# Patient Record
Sex: MALE | Race: WHITE | URBAN - METROPOLITAN AREA
[De-identification: names, ages, dates, MRNs, and addresses within clinical notes are randomized per-mention and may not be internally consistent; named-entity substitution may affect disease eponyms.]

---

## 2022-01-26 ENCOUNTER — LAB OUTSIDE AN ENCOUNTER (OUTPATIENT)
Dept: URBAN - METROPOLITAN AREA CLINIC 19 | Facility: CLINIC | Age: 72
End: 2022-01-26

## 2022-01-26 ENCOUNTER — OFFICE VISIT (OUTPATIENT)
Dept: URBAN - METROPOLITAN AREA CLINIC 19 | Facility: CLINIC | Age: 72
End: 2022-01-26
Payer: MEDICARE

## 2022-01-26 ENCOUNTER — WEB ENCOUNTER (OUTPATIENT)
Dept: URBAN - METROPOLITAN AREA CLINIC 19 | Facility: CLINIC | Age: 72
End: 2022-01-26

## 2022-01-26 ENCOUNTER — TELEPHONE ENCOUNTER (OUTPATIENT)
Dept: URBAN - METROPOLITAN AREA CLINIC 19 | Facility: CLINIC | Age: 72
End: 2022-01-26

## 2022-01-26 VITALS
TEMPERATURE: 98.1 F | BODY MASS INDEX: 27.36 KG/M2 | WEIGHT: 202 LBS | SYSTOLIC BLOOD PRESSURE: 134 MMHG | HEART RATE: 88 BPM | HEIGHT: 72 IN | DIASTOLIC BLOOD PRESSURE: 80 MMHG

## 2022-01-26 DIAGNOSIS — D12.6 COLON ADENOMA: ICD-10-CM

## 2022-01-26 DIAGNOSIS — K59.00 CONSTIPATION, UNSPECIFIED CONSTIPATION TYPE: ICD-10-CM

## 2022-01-26 DIAGNOSIS — Z86.010 PERSONAL HISTORY OF COLONIC POLYPS: ICD-10-CM

## 2022-01-26 PROCEDURE — 99213 OFFICE O/P EST LOW 20 MIN: CPT | Performed by: NURSE PRACTITIONER

## 2022-01-26 NOTE — HPI-TODAY'S VISIT:
Mr. Wynn is a 71-year-old male who presents today for constipation and colon cancer screening.  His last colonoscopy was with Dr. Neff 8/7/2019-preparation of the colon was fair, one 6 mm polyp in the ascending colon, six 8 to 15 mm polyps in the transverse colon, diverticulosis in the sigmoid colon and ascending colon, two 10 to 15 mm polyps in the sigmoid colon, one 25 mm polyp in the rectum.  Repeat colonoscopy in 1 year. Polyps were all noted to be tubular adenomas with the exception of the rectal polyp which was adenomatous polyp with focal high-grade dysplasia. Also, of note he had had a colonoscopy 5/2019 where many large polyps were also found and he was advised to repeat in 2 months. Today, he reports he has been having issues with constipation for about a year now.  He reports since doing Opdivo treatment for lung cancer he has had constipation.  He was having a bowel movement about 3 times a week.  He reports he began eating prunes daily about a week ago and has been having daily bowel movements.  He also reports less bloating.  He reports prior to Opdivo treatment he was having a daily bowel movement.  He denies abdominal pain.  No blood in stool.  CBC from 12/1/2021 with no noted anemia.  Reports he is tried fiber and MiraLAX in the past with no improvement. He denies cardiac/kidney/lung disease, diabetes, blood thinners, and home O2.

## 2022-01-27 PROBLEM — 14760008: Status: ACTIVE | Noted: 2022-01-26

## 2022-01-27 PROBLEM — 399432003: Status: ACTIVE | Noted: 2022-01-26

## 2022-02-02 ENCOUNTER — OFFICE VISIT (OUTPATIENT)
Dept: URBAN - METROPOLITAN AREA LAB 2 | Facility: LAB | Age: 72
End: 2022-02-02
Payer: MEDICARE

## 2022-02-02 DIAGNOSIS — Z86.010 ADENOMAS PERSONAL HISTORY OF COLONIC POLYPS: ICD-10-CM

## 2022-02-02 DIAGNOSIS — D12.2 ADENOMA OF ASCENDING COLON: ICD-10-CM

## 2022-02-02 DIAGNOSIS — D12.3 ADENOMA OF TRANSVERSE COLON: ICD-10-CM

## 2022-02-02 DIAGNOSIS — D12.5 ADENOMA OF SIGMOID COLON: ICD-10-CM

## 2022-02-02 PROCEDURE — 45385 COLONOSCOPY W/LESION REMOVAL: CPT | Performed by: INTERNAL MEDICINE

## 2022-02-24 ENCOUNTER — OFFICE VISIT (OUTPATIENT)
Dept: URBAN - METROPOLITAN AREA CLINIC 19 | Facility: CLINIC | Age: 72
End: 2022-02-24
Payer: MEDICARE

## 2022-02-24 VITALS
BODY MASS INDEX: 27.22 KG/M2 | WEIGHT: 201 LBS | HEIGHT: 72 IN | TEMPERATURE: 98.7 F | DIASTOLIC BLOOD PRESSURE: 80 MMHG | HEART RATE: 83 BPM | OXYGEN SATURATION: 94 % | SYSTOLIC BLOOD PRESSURE: 132 MMHG

## 2022-02-24 DIAGNOSIS — K59.09 CHANGE IN BOWEL MOVEMENTS INTERMITTENT CONSTIPATION. URGENCY IN THE MORNING.: ICD-10-CM

## 2022-02-24 DIAGNOSIS — Z86.010 PERSONAL HISTORY OF COLONIC POLYPS: ICD-10-CM

## 2022-02-24 PROCEDURE — 99213 OFFICE O/P EST LOW 20 MIN: CPT | Performed by: NURSE PRACTITIONER

## 2022-02-24 NOTE — HPI-TODAY'S VISIT:
1/26/22: Mr. Wynn is a 71-year-old male who presents today for constipation and colon cancer screening.  His last colonoscopy was with Dr. Neff 8/7/2019-preparation of the colon was fair, one 6 mm polyp in the ascending colon, six 8 to 15 mm polyps in the transverse colon, diverticulosis in the sigmoid colon and ascending colon, two 10 to 15 mm polyps in the sigmoid colon, one 25 mm polyp in the rectum.  Repeat colonoscopy in 1 year. Polyps were all noted to be tubular adenomas with the exception of the rectal polyp which was adenomatous polyp with focal high-grade dysplasia. Also, of note he had had a colonoscopy 5/2019 where many large polyps were also found and he was advised to repeat in 2 months. Today, he reports he has been having issues with constipation for about a year now.  He reports since doing Opdivo treatment for lung cancer he has had constipation.  He was having a bowel movement about 3 times a week.  He reports he began eating prunes daily about a week ago and has been having daily bowel movements.  He also reports less bloating.  He reports prior to Opdivo treatment he was having a daily bowel movement.  He denies abdominal pain.  No blood in stool.  CBC from 12/1/2021 with no noted anemia.  Reports he is tried fiber and MiraLAX in the past with no improvement. He denies cardiac/kidney/lung disease, diabetes, blood thinners, and home O2.  Colonoscopy with Dr. Neff 2/2/2022-preparation of the colon was fair. Two 5 to 6 mm polyps in the transverse colon. One 6 mm polyp in the ascending colon. Four 5 to 9 polyps at the hepatic flexure. Four 5 to 10 polyps in the proximal transverse colon. One 23 mm polyp in the transverse colon. One 12 mm polyp in the sigmoid colon. One 10 mm polyp in the sigmoid colon.  Repeat colonoscopy in 1 year.  Needs 2-day prep.  Refer to genetic counselor as he has had over 20 polyps.    2/24/22: Today, Mr. Wynn reports his constipation was better after he had his colonoscopy.  He reports he is taking MiraLAX 3-5 times a week.  He reports he is eating a higher fiber diet and less fried food.  He reports he will have 3 bowel movements in 7 days.

## 2022-02-28 ENCOUNTER — OFFICE VISIT (OUTPATIENT)
Dept: URBAN - METROPOLITAN AREA CLINIC 19 | Facility: CLINIC | Age: 72
End: 2022-02-28

## 2022-05-23 ENCOUNTER — OFFICE VISIT (OUTPATIENT)
Dept: URBAN - METROPOLITAN AREA CLINIC 19 | Facility: CLINIC | Age: 72
End: 2022-05-23
Payer: MEDICARE

## 2022-05-23 VITALS
WEIGHT: 203 LBS | OXYGEN SATURATION: 96 % | HEIGHT: 72 IN | HEART RATE: 78 BPM | SYSTOLIC BLOOD PRESSURE: 151 MMHG | TEMPERATURE: 98.2 F | DIASTOLIC BLOOD PRESSURE: 93 MMHG | BODY MASS INDEX: 27.5 KG/M2

## 2022-05-23 DIAGNOSIS — Z86.010 HISTORY OF COLON POLYPS: ICD-10-CM

## 2022-05-23 DIAGNOSIS — K59.04 CHRONIC IDIOPATHIC CONSTIPATION: ICD-10-CM

## 2022-05-23 PROCEDURE — 99214 OFFICE O/P EST MOD 30 MIN: CPT | Performed by: INTERNAL MEDICINE

## 2022-05-23 NOTE — HPI-TODAY'S VISIT:
1/26/22: Mr. Wynn is a 71-year-old male who presents today for constipation and colon cancer screening.  His last colonoscopy was with Dr. Neff 8/7/2019-preparation of the colon was fair, one 6 mm polyp in the ascending colon, six 8 to 15 mm polyps in the transverse colon, diverticulosis in the sigmoid colon and ascending colon, two 10 to 15 mm polyps in the sigmoid colon, one 25 mm polyp in the rectum.  Repeat colonoscopy in 1 year. Polyps were all noted to be tubular adenomas with the exception of the rectal polyp which was adenomatous polyp with focal high-grade dysplasia. Also, of note he had had a colonoscopy 5/2019 where many large polyps were also found and he was advised to repeat in 2 months. Today, he reports he has been having issues with constipation for about a year now.  He reports since doing Opdivo treatment for lung cancer he has had constipation.  He was having a bowel movement about 3 times a week.  He reports he began eating prunes daily about a week ago and has been having daily bowel movements.  He also reports less bloating.  He reports prior to Opdivo treatment he was having a daily bowel movement.  He denies abdominal pain.  No blood in stool.  CBC from 12/1/2021 with no noted anemia.  Reports he is tried fiber and MiraLAX in the past with no improvement. He denies cardiac/kidney/lung disease, diabetes, blood thinners, and home O2.  Colonoscopy with Dr. Neff 2/2/2022-preparation of the colon was fair. Two 5 to 6 mm polyps in the transverse colon. One 6 mm polyp in the ascending colon. Four 5 to 9 polyps at the hepatic flexure. Four 5 to 10 polyps in the proximal transverse colon. One 23 mm polyp in the transverse colon. One 12 mm polyp in the sigmoid colon. One 10 mm polyp in the sigmoid colon.  Repeat colonoscopy in 1 year.  Needs 2-day prep.  Refer to genetic counselor as he has had over 20 polyps.    2/24/22: Today, Mr. Wynn reports his constipation was better after he had his colonoscopy.  He reports he is taking MiraLAX 3-5 times a week.  He reports he is eating a higher fiber diet and less fried food.  He reports he will have 3 bowel movements in 7 days.  5/23/2022- Here for 3 month  followup,  Constipation is better on Miralax and increased fiber and water in diet. Anemia has resolved.

## 2022-06-04 PROBLEM — 82934008 CHRONIC IDIOPATHIC CONSTIPATION: Status: ACTIVE | Noted: 2022-06-04

## 2022-06-04 PROBLEM — 428283002 HISTORY OF POLYP OF COLON: Status: ACTIVE | Noted: 2022-06-04

## 2024-02-20 ENCOUNTER — OV EP (OUTPATIENT)
Dept: URBAN - METROPOLITAN AREA CLINIC 19 | Facility: CLINIC | Age: 74
End: 2024-02-20
Payer: MEDICARE

## 2024-02-20 VITALS
HEART RATE: 64 BPM | HEIGHT: 72 IN | SYSTOLIC BLOOD PRESSURE: 136 MMHG | DIASTOLIC BLOOD PRESSURE: 79 MMHG | BODY MASS INDEX: 23.84 KG/M2 | TEMPERATURE: 97 F | WEIGHT: 176 LBS

## 2024-02-20 DIAGNOSIS — D12.6 COLON ADENOMA: ICD-10-CM

## 2024-02-20 DIAGNOSIS — K59.04 CHRONIC IDIOPATHIC CONSTIPATION: ICD-10-CM

## 2024-02-20 DIAGNOSIS — Z86.010 PERSONAL HISTORY OF COLONIC POLYPS: ICD-10-CM

## 2024-02-20 PROBLEM — 431857002: Status: ACTIVE | Noted: 2024-02-20

## 2024-02-20 PROCEDURE — 99214 OFFICE O/P EST MOD 30 MIN: CPT | Performed by: INTERNAL MEDICINE

## 2024-02-20 RX ORDER — POLYETHYLENE GLYCOL 3350, SODIUM SULFATE ANHYDROUS, SODIUM BICARBONATE, SODIUM CHLORIDE, POTASSIUM CHLORIDE 236; 22.74; 6.74; 5.86; 2.97 G/4L; G/4L; G/4L; G/4L; G/4L
4000MILLILITERS POWDER, FOR SOLUTION ORAL ONCE A DAY
Qty: 2 KIT | Refills: 0 | OUTPATIENT
Start: 2024-02-20 | End: 2024-02-22

## 2024-03-20 ENCOUNTER — COLON (OUTPATIENT)
Dept: URBAN - METROPOLITAN AREA MEDICAL CENTER 25 | Facility: MEDICAL CENTER | Age: 74
End: 2024-03-20

## 2024-04-17 ENCOUNTER — COLON (OUTPATIENT)
Dept: URBAN - METROPOLITAN AREA MEDICAL CENTER 25 | Facility: MEDICAL CENTER | Age: 74
End: 2024-04-17
Payer: MEDICARE

## 2024-04-17 DIAGNOSIS — Z12.11 COLON CANCER SCREENING: ICD-10-CM

## 2024-04-17 PROCEDURE — 992 APS NON BILLABLE: Performed by: INTERNAL MEDICINE
